# Patient Record
Sex: MALE | Race: WHITE | NOT HISPANIC OR LATINO | Employment: UNEMPLOYED | URBAN - METROPOLITAN AREA
[De-identification: names, ages, dates, MRNs, and addresses within clinical notes are randomized per-mention and may not be internally consistent; named-entity substitution may affect disease eponyms.]

---

## 2020-01-12 ENCOUNTER — HOSPITAL ENCOUNTER (EMERGENCY)
Facility: HOSPITAL | Age: 35
Discharge: HOME | End: 2020-01-12
Attending: INTERNAL MEDICINE
Payer: COMMERCIAL

## 2020-01-12 VITALS
WEIGHT: 175 LBS | RESPIRATION RATE: 20 BRPM | OXYGEN SATURATION: 99 % | SYSTOLIC BLOOD PRESSURE: 142 MMHG | DIASTOLIC BLOOD PRESSURE: 81 MMHG | HEART RATE: 78 BPM | TEMPERATURE: 97.4 F

## 2020-01-12 DIAGNOSIS — M25.511 ACUTE PAIN OF RIGHT SHOULDER: Primary | ICD-10-CM

## 2020-01-12 PROCEDURE — 99283 EMERGENCY DEPT VISIT LOW MDM: CPT

## 2020-01-12 PROCEDURE — 99284 EMERGENCY DEPT VISIT MOD MDM: CPT | Performed by: INTERNAL MEDICINE

## 2020-01-12 PROCEDURE — 96372 THER/PROPH/DIAG INJ SC/IM: CPT

## 2020-01-12 RX ORDER — METHYLPREDNISOLONE 4 MG/1
TABLET ORAL
Qty: 21 TABLET | Refills: 0 | Status: SHIPPED | OUTPATIENT
Start: 2020-01-12

## 2020-01-12 RX ORDER — PREDNISONE 20 MG/1
40 TABLET ORAL ONCE
Status: COMPLETED | OUTPATIENT
Start: 2020-01-12 | End: 2020-01-12

## 2020-01-12 RX ORDER — KETOROLAC TROMETHAMINE 30 MG/ML
30 INJECTION, SOLUTION INTRAMUSCULAR; INTRAVENOUS ONCE
Status: COMPLETED | OUTPATIENT
Start: 2020-01-12 | End: 2020-01-12

## 2020-01-12 RX ORDER — GABAPENTIN 300 MG/1
300 CAPSULE ORAL 3 TIMES DAILY
Qty: 30 CAPSULE | Refills: 0 | Status: SHIPPED | OUTPATIENT
Start: 2020-01-12

## 2020-01-12 RX ADMIN — KETOROLAC TROMETHAMINE 30 MG: 30 INJECTION, SOLUTION INTRAMUSCULAR; INTRAVENOUS at 07:59

## 2020-01-12 RX ADMIN — PREDNISONE 40 MG: 20 TABLET ORAL at 08:39

## 2020-01-12 NOTE — ED PROVIDER NOTES
History  Chief Complaint   Patient presents with    Shoulder Pain     onset fri  Denies injury/trauma  Took 5 Aleeve this am w/o relief     52-year-old male arrives via EMS complaining of right shoulder pain  The patient is actually pointing to his [facility] joint states the pain is radiating down his shoulder to his hand  He has tingling and burning  Patient states the pain started on Friday he denies any trauma or injury or previous event  States he cannot get comfortable the pain is with any kind of movement even at rest but worse with certain movements  He denies any chest pain pressure heaviness tightness or shortness of breath  He states he took 5 extra-strength Tylenol over about a 4 hour period without any relief  None       History reviewed  No pertinent past medical history  History reviewed  No pertinent surgical history  History reviewed  No pertinent family history  I have reviewed and agree with the history as documented  Social History     Tobacco Use    Smoking status: Current Every Day Smoker    Smokeless tobacco: Never Used   Substance Use Topics    Alcohol use: Never     Frequency: Never    Drug use: Yes     Types: Marijuana        Review of Systems   Constitutional: Negative  Respiratory: Negative  Cardiovascular: Negative  Gastrointestinal: Negative  Genitourinary: Negative  Musculoskeletal: Positive for arthralgias  Negative for neck pain and neck stiffness  Skin: Negative  Neurological: Negative  Hematological: Negative  Psychiatric/Behavioral: Negative  Physical Exam  Physical Exam   Constitutional: He is oriented to person, place, and time  He appears well-developed and well-nourished  HENT:   Head: Normocephalic and atraumatic  Eyes: Pupils are equal, round, and reactive to light  EOM are normal    Neck: Normal range of motion  Neck supple     Patient with full range of motion no point tenderness no pain on rotation of head and neck    Cardiovascular: Normal rate, regular rhythm, normal heart sounds and intact distal pulses  Pulmonary/Chest: Effort normal and breath sounds normal    Abdominal: Soft  Bowel sounds are normal    Musculoskeletal: Normal range of motion  He exhibits tenderness  Patient has full range of motion of his shoulder but he has point tenderness in the [facility] joint and some over the deltoid itself  There is no erythema no rash no other type of lesion  Pain seems to actually be alleviated when lifting to a certain level at about 30° however when she had about 45° the pain starts becoming more intense  Neurological: He is alert and oriented to person, place, and time  Skin: Skin is warm and dry  Capillary refill takes less than 2 seconds  Psychiatric: He has a normal mood and affect  His behavior is normal    Nursing note and vitals reviewed        Vital Signs  ED Triage Vitals   Temperature Pulse Respirations Blood Pressure SpO2   01/12/20 0744 01/12/20 0744 01/12/20 0744 01/12/20 0744 01/12/20 0744   (!) 97 4 °F (36 3 °C) 80 18 159/94 100 %      Temp Source Heart Rate Source Patient Position - Orthostatic VS BP Location FiO2 (%)   01/12/20 0744 01/12/20 0744 01/12/20 0744 01/12/20 0744 --   Temporal Monitor Lying Left arm       Pain Score       01/12/20 0745       Worst Possible Pain           Vitals:    01/12/20 0744   BP: 159/94   Pulse: 80   Patient Position - Orthostatic VS: Lying         Visual Acuity      ED Medications  Medications   predniSONE tablet 40 mg (has no administration in time range)   ketorolac (TORADOL) injection 30 mg (30 mg Intramuscular Given 1/12/20 0751)       Diagnostic Studies  Results Reviewed     None                 No orders to display              Procedures  Procedures         ED Course                               MDM      Disposition  Final diagnoses:   Acute pain of right shoulder     Time reflects when diagnosis was documented in both MDM as applicable and the Disposition within this note     Time User Action Codes Description Comment    1/12/2020  8:33 AM Austin Later Add [B11 047] Acute pain of right shoulder       ED Disposition     ED Disposition Condition Date/Time Comment    Discharge Stable Sun Jan 12, 2020  8:33 AM Lorelei Gearing Best discharge to home/self care  Follow-up Information    None         Patient's Medications   Discharge Prescriptions    GABAPENTIN (NEURONTIN) 300 MG CAPSULE    Take 1 capsule (300 mg total) by mouth 3 (three) times a day For post-herpetic neuralgia: Take 1 tablet on day 1,  Then take 2 tablets on day 2, Then take 3 tablets on day 3 and every day after that as instructed by your doctor  Start Date: 1/12/2020 End Date: --       Order Dose: 300 mg       Quantity: 30 capsule    Refills: 0    METHYLPREDNISOLONE 4 MG TABLET THERAPY PACK    Use as directed on package       Start Date: 1/12/2020 End Date: --       Order Dose: --       Quantity: 21 tablet    Refills: 0     No discharge procedures on file      ED Provider  Electronically Signed by           Vickie Bass MD  01/12/20 6519

## 2022-12-02 ENCOUNTER — HOSPITAL ENCOUNTER (EMERGENCY)
Facility: HOSPITAL | Age: 37
Discharge: HOME | End: 2022-12-02
Attending: EMERGENCY MEDICINE

## 2022-12-02 VITALS
SYSTOLIC BLOOD PRESSURE: 109 MMHG | OXYGEN SATURATION: 96 % | HEART RATE: 72 BPM | TEMPERATURE: 97.1 F | RESPIRATION RATE: 18 BRPM | DIASTOLIC BLOOD PRESSURE: 64 MMHG | HEIGHT: 72 IN | BODY MASS INDEX: 22.35 KG/M2 | WEIGHT: 165 LBS

## 2022-12-02 DIAGNOSIS — T50.901A OVERDOSE: Primary | ICD-10-CM

## 2022-12-02 RX ORDER — NALOXONE HYDROCHLORIDE 1 MG/ML
1 INJECTION PARENTERAL ONCE
Status: COMPLETED | OUTPATIENT
Start: 2022-12-02 | End: 2022-12-02

## 2022-12-02 RX ADMIN — NALOXONE HYDROCHLORIDE 4 MG: 4 SPRAY NASAL at 15:42

## 2022-12-02 NOTE — ED PROVIDER NOTES
History  Chief Complaint   Patient presents with   • Overdose - Accidental     71-year-old male presents after he was found unresponsive  Patient states that he found some substance and snorted it  The patient was found by EMS with pinpoint pupils, lethargic  He was given 2 mg of Narcan intranasally his mental status improved  Patient currently denies complaint except being cold  Of note his significant other was also found in a similar condition and is also a patient in the emergency department, which he is aware of  When she overdosed he tried to arouse her by placing her in the cold shower  Patient then overdose in the cold shower  He is currently wet, and shivering  Others and being cold he has no complaints  Prior to Admission Medications   Prescriptions Last Dose Informant Patient Reported? Taking?   gabapentin (NEURONTIN) 300 mg capsule   No No   Sig: Take 1 capsule (300 mg total) by mouth 3 (three) times a day For post-herpetic neuralgia: Take 1 tablet on day 1,  Then take 2 tablets on day 2, Then take 3 tablets on day 3 and every day after that as instructed by your doctor  methylPREDNISolone 4 MG tablet therapy pack   No No   Sig: Use as directed on package      Facility-Administered Medications: None       History reviewed  No pertinent past medical history  History reviewed  No pertinent surgical history  History reviewed  No pertinent family history  I have reviewed and agree with the history as documented  E-Cigarette/Vaping     E-Cigarette/Vaping Substances     Social History     Tobacco Use   • Smoking status: Every Day   • Smokeless tobacco: Never   Substance Use Topics   • Alcohol use: Never   • Drug use: Yes     Types: Marijuana       Review of Systems    Physical Exam  Physical Exam  Vitals and nursing note reviewed  Constitutional:       Appearance: He is well-developed and well-nourished  HENT:      Head: Normocephalic and atraumatic        Mouth/Throat: Mouth: Oropharynx is clear and moist    Eyes:      Extraocular Movements: EOM normal       Conjunctiva/sclera: Conjunctivae normal       Pupils: Pupils are equal, round, and reactive to light  Neck:      Trachea: No tracheal deviation  Cardiovascular:      Rate and Rhythm: Normal rate and regular rhythm  Heart sounds: Normal heart sounds  No murmur heard  Pulmonary:      Effort: Pulmonary effort is normal  No respiratory distress  Breath sounds: Normal breath sounds  No wheezing or rales  Abdominal:      General: Bowel sounds are normal  There is no distension  Palpations: Abdomen is soft  Tenderness: There is no abdominal tenderness  Musculoskeletal:         General: No deformity or edema  Cervical back: Normal range of motion and neck supple  Skin:     General: Skin is warm and dry  Capillary Refill: Capillary refill takes less than 2 seconds  Neurological:      Mental Status: He is alert and oriented to person, place, and time  Sensory: No sensory deficit  Psychiatric:         Mood and Affect: Mood and affect and mood normal          Judgment: Judgment normal          Vital Signs  ED Triage Vitals   Temperature Pulse Respirations Blood Pressure SpO2   12/02/22 1548 12/02/22 1527 12/02/22 1527 12/02/22 1527 12/02/22 1527   (!) 96 6 °F (35 9 °C) 77 16 155/96 98 %      Temp Source Heart Rate Source Patient Position - Orthostatic VS BP Location FiO2 (%)   12/02/22 1548 12/02/22 1527 12/02/22 1527 12/02/22 1527 --   Temporal Monitor Lying Left arm       Pain Score       12/02/22 1527       No Pain           Vitals:    12/02/22 1527 12/02/22 1750 12/02/22 1837   BP: 155/96 97/59 109/64   Pulse: 77 98 72   Patient Position - Orthostatic VS: Lying Lying Lying         Visual Acuity      ED Medications  Medications   naloxone nasal- Given to patient by provider at discharge   (NARCAN) 4 mg/0 1 mL nasal spray 4 mg (4 mg Does not apply Given by Other 12/2/22 1542)   naloxone (FOR EMS ONLY) [facility]) 2 MG/2ML injection 2 mg (0 mg Does not apply Given to EMS 12/2/22 1539)       Diagnostic Studies  Results Reviewed     None                 No orders to display              Procedures  Procedures         ED Course  ED Course as of 12/02/22 2209   [facility] Dec 02, 2022   1602 Patient requested discharge, convinced to stay longer for observation  Mental status continues to be intact   1623 Again requesting discharge, convinced to stay                                             MDM  Number of Diagnoses or Management Options  Overdose: new and requires workup  Diagnosis management comments: 60-year-old male with symptoms consistent with an overdose, currently has a normal mental status  He requested discharge immediately upon being roomed  Understands the risks of repeat overdose, will dispense Narcan for patient to go home with  Amount and/or Complexity of Data Reviewed  Review and summarize past medical records: yes  Independent visualization of images, tracings, or specimens: yes    Risk of Complications, Morbidity, and/or Mortality  Presenting problems: high  Diagnostic procedures: minimal  Management options: high        Disposition  Final diagnoses:   Overdose     Time reflects when diagnosis was documented in both MDM as applicable and the Disposition within this note     Time User Action Codes Description Comment    12/2/2022  6:13 PM Manuel [address] [place] [address] [place] Overdose       ED Disposition     ED Disposition   Discharge    Condition   Stable    Date/Time   Fri Dec 2, 2022  6:13 PM    Comment   Myra Parsons discharge to home/self care                 Follow-up Information     Follow up With Specialties Details Why Contact Info Additional [address] [facility] Schedule an appointment as soon as possible for a visit in 1 day As needed [address] [facility] [address] [facility], [address] [place], [place], [address], [phone]          Discharge Medication List as of 12/2/2022  6:13 PM      CONTINUE these medications which have NOT CHANGED    Details   gabapentin (NEURONTIN) 300 mg capsule Take 1 capsule (300 mg total) by mouth 3 (three) times a day For post-herpetic neuralgia: Take 1 tablet on day 1,  Then take 2 tablets on day 2, Then take 3 tablets on day 3 and every day after that as instructed by your doctor , Starting Sun 1/12/2020,  Normal      methylPREDNISolone 4 MG tablet therapy pack Use as directed on package, Normal             No discharge procedures on file      PDMP Review     None          ED Provider  Electronically Signed by           Katie Rose DO  12/02/22 2857

## 2023-08-03 ENCOUNTER — HOSPITAL ENCOUNTER (EMERGENCY)
Facility: HOSPITAL | Age: 38
End: 2023-08-04
Attending: EMERGENCY MEDICINE | Admitting: EMERGENCY MEDICINE
Payer: COMMERCIAL

## 2023-08-03 ENCOUNTER — DOCUMENTATION (OUTPATIENT)
Dept: BEHAVIORAL/MENTAL HEALTH CLINIC | Facility: CLINIC | Age: 38
End: 2023-08-03

## 2023-08-03 ENCOUNTER — OFFICE VISIT (OUTPATIENT)
Dept: BEHAVIORAL/MENTAL HEALTH CLINIC | Facility: CLINIC | Age: 38
End: 2023-08-03
Payer: COMMERCIAL

## 2023-08-03 DIAGNOSIS — F19.90 SUBSTANCE USE: ICD-10-CM

## 2023-08-03 DIAGNOSIS — F33.2 SEVERE EPISODE OF RECURRENT MAJOR DEPRESSIVE DISORDER, WITHOUT PSYCHOTIC FEATURES (HCC): Primary | ICD-10-CM

## 2023-08-03 DIAGNOSIS — F32.A DEPRESSION WITH SUICIDAL IDEATION: Primary | ICD-10-CM

## 2023-08-03 DIAGNOSIS — F32.0 CURRENT MILD EPISODE OF MAJOR DEPRESSIVE DISORDER WITHOUT PRIOR EPISODE (HCC): ICD-10-CM

## 2023-08-03 DIAGNOSIS — R45.851 DEPRESSION WITH SUICIDAL IDEATION: Primary | ICD-10-CM

## 2023-08-03 PROCEDURE — 82075 ASSAY OF BREATH ETHANOL: CPT | Performed by: EMERGENCY MEDICINE

## 2023-08-03 PROCEDURE — H2021 COM WRAP-AROUND SV, 15 MIN: HCPCS

## 2023-08-03 NOTE — LETTER
[address] [address]  Dept: [phone]      EMTALA TRANSFER CONSENT    NAME Cristina Parsons                                         1985                              MRN [id number]    I have been informed of my rights regarding examination, treatment, and transfer   by Dr. Gregory Lamas., *    Benefits: Continuity of care    Risks: Potential for delay in receiving treatment      Consent for Transfer:  I acknowledge that my medical condition has been evaluated and explained to me by the emergency department physician or other qualified medical person and/or my attending physician, who has recommended that I be transferred to the service of  Accepting Physician: Rich Benitez at [address] [facility] [address] Name, [address] : [facility]FF[facility] WITH [place], [place]. The above potential benefits of such transfer, the potential risks associated with such transfer, and the probable risks of not being transferred have been explained to me, and I fully understand them. The doctor has explained that, in my case, the benefits of transfer outweigh the risks. I agree to be transferred. I authorize the performance of emergency medical procedures and treatments upon me in both transit and upon arrival at the receiving facility. Additionally, I authorize the release of any and all medical records to the receiving facility and request they be transported with me, if possible. I understand that the safest mode of transportation during a medical emergency is an ambulance and that the Hospital advocates the use of this mode of transport. Risks of traveling to the receiving facility by car, including absence of medical control, life sustaining equipment, such as oxygen, and medical personnel has been explained to me and I fully understand them. (EVGENY CORRECT BOX BELOW)  [ X ]  I consent to the stated transfer and to be transported by ambulance/helicopter.   [  ] I consent to the stated transfer, but refuse transportation by ambulance and accept full responsibility for my transportation by car. I understand the risks of non-ambulance transfers and I exonerate the Hospital and its staff from any deterioration in my condition that results from this refusal.    X___________________________________________    DATE  23  TIME________  Signature of patient or legally responsible individual signing on patient behalf           RELATIONSHIP TO PATIENT______self___________________                  Provider Certification    NAME Yoselin Parsons                                         1985                              MRN [id number]    A medical screening exam was performed on the above named patient. Based on the examination:    Condition Necessitating Transfer The primary encounter diagnosis was Depression with suicidal ideation. A diagnosis of Substance use was also pertinent to this visit. Patient Condition: The patient has been stabilized such that within reasonable medical probability, no material deterioration of the patient condition or the condition of the unborn child(rhea) is likely to result from the transfer    Reason for Transfer: Level of Care needed not available at this facility    Transfer Requirements: Facility [facility], [address] available and qualified personnel available for treatment as acknowledged by Minerva Zaldivar [phone]  Agreed to accept transfer and to provide appropriate medical treatment as acknowledged by       Deborah Acosta  Appropriate medical records of the examination and treatment of the patient are provided at the time of transfer   [address] __IK_____  Transfer will be performed by qualified personnel from [facility]  and appropriate transfer equipment as required, including the use of necessary and appropriate life support measures.     Provider Certification: I have examined the patient and explained the following risks and benefits of being transferred/refusing transfer to the patient/family:  The patient is stable for psychiatric transfer because they are medically stable, and is protected from harming him/herself or others during transport      Based on these reasonable risks and benefits to the patient and/or the unborn child(rhea), and based upon the information available at the time of the patient’s examination, I certify that the medical benefits reasonably to be expected from the provision of appropriate medical treatments at another medical facility outweigh the increasing risks, if any, to the individual’s medical condition, and in the case of labor to the unborn child, from effecting the transfer.     X____________________________________________ DATE 08/04/23        TIME_______      ORIGINAL - SEND TO MEDICAL RECORDS   COPY - SEND WITH PATIENT DURING TRANSFER

## 2023-08-04 ENCOUNTER — HOSPITAL ENCOUNTER (INPATIENT)
Facility: HOSPITAL | Age: 38
LOS: 2 days | Discharge: HOME | DRG: 754 | End: 2023-08-06
Attending: HOSPITALIST | Admitting: HOSPITALIST
Payer: COMMERCIAL

## 2023-08-04 VITALS
OXYGEN SATURATION: 99 % | HEIGHT: 72 IN | SYSTOLIC BLOOD PRESSURE: 122 MMHG | BODY MASS INDEX: 22.38 KG/M2 | HEART RATE: 85 BPM | RESPIRATION RATE: 20 BRPM | TEMPERATURE: 97.3 F | DIASTOLIC BLOOD PRESSURE: 79 MMHG

## 2023-08-04 DIAGNOSIS — F39 MOOD DISORDER (HCC): ICD-10-CM

## 2023-08-04 DIAGNOSIS — F41.9 ANXIETY: ICD-10-CM

## 2023-08-04 DIAGNOSIS — F32.2 CURRENT SEVERE EPISODE OF MAJOR DEPRESSIVE DISORDER WITHOUT PSYCHOTIC FEATURES WITHOUT PRIOR EPISODE (HCC): Primary | ICD-10-CM

## 2023-08-04 DIAGNOSIS — F19.90 SUBSTANCE USE: ICD-10-CM

## 2023-08-04 LAB
AMPHETAMINES SERPL QL SCN: POSITIVE
BARBITURATES UR QL: NEGATIVE
BENZODIAZ UR QL: NEGATIVE
COCAINE UR QL: NEGATIVE
ETHANOL EXG-MCNC: 0 MG/DL
METHADONE UR QL: NEGATIVE
OPIATES UR QL SCN: NEGATIVE
OXYCODONE+OXYMORPHONE UR QL SCN: NEGATIVE
PCP UR QL: NEGATIVE
THC UR QL: POSITIVE

## 2023-08-04 PROCEDURE — 80307 DRUG TEST PRSMV CHEM ANLYZR: CPT | Performed by: EMERGENCY MEDICINE

## 2023-08-04 RX ORDER — ACETAMINOPHEN 325 MG/1
650 TABLET ORAL EVERY 4 HOURS PRN
Status: DISCONTINUED | OUTPATIENT
Start: 2023-08-04 | End: 2023-08-06 | Stop reason: HOSPADM

## 2023-08-04 RX ORDER — OLANZAPINE 2.5 MG/1
2.5 TABLET ORAL
Status: CANCELLED | OUTPATIENT
Start: 2023-08-04

## 2023-08-04 RX ORDER — MAGNESIUM HYDROXIDE/ALUMINUM HYDROXICE/SIMETHICONE 120; 1200; 1200 MG/30ML; MG/30ML; MG/30ML
30 SUSPENSION ORAL EVERY 4 HOURS PRN
Status: DISCONTINUED | OUTPATIENT
Start: 2023-08-04 | End: 2023-08-06 | Stop reason: HOSPADM

## 2023-08-04 RX ORDER — BENZTROPINE MESYLATE 0.5 MG/1
1 TABLET ORAL EVERY 6 HOURS PRN
Status: CANCELLED | OUTPATIENT
Start: 2023-08-04

## 2023-08-04 RX ORDER — HYDROXYZINE 50 MG/1
50 TABLET, FILM COATED ORAL
Status: DISCONTINUED | OUTPATIENT
Start: 2023-08-04 | End: 2023-08-06 | Stop reason: HOSPADM

## 2023-08-04 RX ORDER — TRAZODONE HYDROCHLORIDE 50 MG/1
100 TABLET ORAL
Status: CANCELLED | OUTPATIENT
Start: 2023-08-04

## 2023-08-04 RX ORDER — OLANZAPINE 2.5 MG/1
2.5 TABLET ORAL
Status: DISCONTINUED | OUTPATIENT
Start: 2023-08-04 | End: 2023-08-06 | Stop reason: HOSPADM

## 2023-08-04 RX ORDER — OLANZAPINE 10 MG/1
2.5 INJECTION, POWDER, LYOPHILIZED, FOR SOLUTION INTRAMUSCULAR
Status: DISCONTINUED | OUTPATIENT
Start: 2023-08-04 | End: 2023-08-06 | Stop reason: HOSPADM

## 2023-08-04 RX ORDER — OLANZAPINE 5 MG/1
5 TABLET ORAL
Status: CANCELLED | OUTPATIENT
Start: 2023-08-04

## 2023-08-04 RX ORDER — HYDROXYZINE 50 MG/1
100 TABLET, FILM COATED ORAL
Status: CANCELLED | OUTPATIENT
Start: 2023-08-04

## 2023-08-04 RX ORDER — DIPHENHYDRAMINE HYDROCHLORIDE 50 MG/ML
50 INJECTION INTRAMUSCULAR; INTRAVENOUS EVERY 6 HOURS PRN
Status: DISCONTINUED | OUTPATIENT
Start: 2023-08-04 | End: 2023-08-06 | Stop reason: HOSPADM

## 2023-08-04 RX ORDER — HYDROXYZINE 50 MG/1
100 TABLET, FILM COATED ORAL
Status: DISCONTINUED | OUTPATIENT
Start: 2023-08-04 | End: 2023-08-06 | Stop reason: HOSPADM

## 2023-08-04 RX ORDER — TRAZODONE HYDROCHLORIDE 100 MG/1
100 TABLET ORAL
Status: DISCONTINUED | OUTPATIENT
Start: 2023-08-04 | End: 2023-08-06 | Stop reason: HOSPADM

## 2023-08-04 RX ORDER — OLANZAPINE 10 MG/1
2.5 INJECTION, POWDER, LYOPHILIZED, FOR SOLUTION INTRAMUSCULAR
Status: CANCELLED | OUTPATIENT
Start: 2023-08-04

## 2023-08-04 RX ORDER — HYDROXYZINE HYDROCHLORIDE 25 MG/1
25 TABLET, FILM COATED ORAL
Status: DISCONTINUED | OUTPATIENT
Start: 2023-08-04 | End: 2023-08-06 | Stop reason: HOSPADM

## 2023-08-04 RX ORDER — OLANZAPINE 10 MG/1
5 INJECTION, POWDER, LYOPHILIZED, FOR SOLUTION INTRAMUSCULAR
Status: DISCONTINUED | OUTPATIENT
Start: 2023-08-04 | End: 2023-08-06 | Stop reason: HOSPADM

## 2023-08-04 RX ORDER — ACETAMINOPHEN 325 MG/1
650 TABLET ORAL EVERY 4 HOURS PRN
Status: CANCELLED | OUTPATIENT
Start: 2023-08-04

## 2023-08-04 RX ORDER — LORAZEPAM 2 MG/ML
2 INJECTION INTRAMUSCULAR EVERY 6 HOURS PRN
Status: CANCELLED | OUTPATIENT
Start: 2023-08-04

## 2023-08-04 RX ORDER — ACETAMINOPHEN 325 MG/1
975 TABLET ORAL EVERY 6 HOURS PRN
Status: CANCELLED | OUTPATIENT
Start: 2023-08-04

## 2023-08-04 RX ORDER — HYDROXYZINE 50 MG/1
50 TABLET, FILM COATED ORAL
Status: CANCELLED | OUTPATIENT
Start: 2023-08-04

## 2023-08-04 RX ORDER — HYDROXYZINE HYDROCHLORIDE 25 MG/1
25 TABLET, FILM COATED ORAL
Status: CANCELLED | OUTPATIENT
Start: 2023-08-04

## 2023-08-04 RX ORDER — ACETAMINOPHEN 325 MG/1
975 TABLET ORAL EVERY 6 HOURS PRN
Status: DISCONTINUED | OUTPATIENT
Start: 2023-08-04 | End: 2023-08-06 | Stop reason: HOSPADM

## 2023-08-04 RX ORDER — NICOTINE 21 MG/24HR
1 PATCH, TRANSDERMAL 24 HOURS TRANSDERMAL DAILY
Status: CANCELLED | OUTPATIENT
Start: 2023-08-04

## 2023-08-04 RX ORDER — POLYETHYLENE GLYCOL 3350 17 G/17G
17 POWDER, FOR SOLUTION ORAL DAILY PRN
Status: CANCELLED | OUTPATIENT
Start: 2023-08-04

## 2023-08-04 RX ORDER — ACETAMINOPHEN 325 MG/1
650 TABLET ORAL EVERY 6 HOURS PRN
Status: CANCELLED | OUTPATIENT
Start: 2023-08-04

## 2023-08-04 RX ORDER — DIPHENHYDRAMINE HYDROCHLORIDE 50 MG/ML
50 INJECTION INTRAMUSCULAR; INTRAVENOUS EVERY 6 HOURS PRN
Status: CANCELLED | OUTPATIENT
Start: 2023-08-04

## 2023-08-04 RX ORDER — NICOTINE 21 MG/24HR
1 PATCH, TRANSDERMAL 24 HOURS TRANSDERMAL DAILY
Status: DISCONTINUED | OUTPATIENT
Start: 2023-08-05 | End: 2023-08-06 | Stop reason: HOSPADM

## 2023-08-04 RX ORDER — OLANZAPINE 5 MG/1
5 TABLET ORAL
Status: DISCONTINUED | OUTPATIENT
Start: 2023-08-04 | End: 2023-08-06 | Stop reason: HOSPADM

## 2023-08-04 RX ORDER — LORAZEPAM 2 MG/ML
2 INJECTION INTRAMUSCULAR EVERY 6 HOURS PRN
Status: DISCONTINUED | OUTPATIENT
Start: 2023-08-04 | End: 2023-08-06 | Stop reason: HOSPADM

## 2023-08-04 RX ORDER — MAGNESIUM HYDROXIDE/ALUMINUM HYDROXICE/SIMETHICONE 120; 1200; 1200 MG/30ML; MG/30ML; MG/30ML
30 SUSPENSION ORAL EVERY 4 HOURS PRN
Status: CANCELLED | OUTPATIENT
Start: 2023-08-04

## 2023-08-04 RX ORDER — OLANZAPINE 10 MG/1
5 INJECTION, POWDER, LYOPHILIZED, FOR SOLUTION INTRAMUSCULAR
Status: CANCELLED | OUTPATIENT
Start: 2023-08-04

## 2023-08-04 RX ORDER — ACETAMINOPHEN 325 MG/1
650 TABLET ORAL EVERY 6 HOURS PRN
Status: DISCONTINUED | OUTPATIENT
Start: 2023-08-04 | End: 2023-08-06 | Stop reason: HOSPADM

## 2023-08-04 RX ORDER — POLYETHYLENE GLYCOL 3350 17 G/17G
17 POWDER, FOR SOLUTION ORAL DAILY PRN
Status: DISCONTINUED | OUTPATIENT
Start: 2023-08-04 | End: 2023-08-06 | Stop reason: HOSPADM

## 2023-08-04 RX ORDER — BENZTROPINE MESYLATE 1 MG/1
1 TABLET ORAL EVERY 6 HOURS PRN
Status: DISCONTINUED | OUTPATIENT
Start: 2023-08-04 | End: 2023-08-06 | Stop reason: HOSPADM

## 2023-08-04 NOTE — PROGRESS NOTES
Pt admitted with the following belongings     Pt belongings to room;    2 T-shirts   1 Pair of white socks   1 Shoes     Pt belongings to storage;    1 Jeans   1 Tan Joggers   1 Green shirt  1 Red sweatshirt with barber  1 Gray and Black backpack   1 Protein bar   1 Black money card (chipped in the middle)  Laces from shoes   Screw  tool   Wrench tool   Bag of Cigarettes     Patient belongings to drawer;    3 Lighters   1 White    2 headphones (white and blue)  1 Black cube for charging   1 Cellular device

## 2023-08-04 NOTE — PROGRESS NOTES
Assessment      Crisis Intake  Patient Intake  Living Arrangement: Apartment  Can patient return home?: Yes  Address to be Discharge to[de-identified] REfer to face sheet  Patient's Telephone Number: Refer to face sheet  Access to Firearms: No (Denies)  Work History: Unemployed  Admission Status  Status of Admission: [address] of Residence: [place]  Act 77: [address] [facility] Notified?: No  72 Hour Notice: Initiated  Date patient signed 72h Notice: 08/03/23  Time patient signed 72h Notice: 0853  Patient History  Presenting Problems: Pt presented to the [address] [place] [address] for a psychiatric evaluation. He endorses suicidal ideation with no specific plan. Pt states he has been feeling derpessed for several weeks. He lost his job 2 days ago. He has been sleep deprieved, loss of appetite. Pt states he has not showered in 2 weeks. He denies alcohol or substance use, denies homicidal ideations, auditory, visual halluciations or thougths to self harm. Pt denies past psychiatric history. Pt is oriented across all spheres, has a flat affect, speaks softly and has a depressed mood. This writer discussed treatment options with patient. Patient signed 61 [phone] 81 after rights and 72 hours were discussed and reviewed. Treatment History: Denies past treatment history  Currently in Treatment: No  Medical Problems: refer to medical report  Legal Issues: Denies  Probation/ Name (if applicable): Denies  Substance Abuse: No (Denies)  Mental Status Exam  Orientation Level: Appropriate for age, Oriented X4  Mood: Depressed, Despairing  Thought Content: Suicidal ideation  Hallucination Type: No problems reported or observed.   Judgement: Poor  Impulse Control: Fair  Attention Span: Appropriate for age  Memory: Intact  Appetite: Poor  Sleep: Poor  Strengths and Limitations  Patient Strengths: Cooperative, Negotiates basic needs  Patient Limitations: Financial instability, Unresourceful, Poor reasoning ability  Ideations  Current Self Harm/Suicidal Ideation: Yes  Description of current self harm/suicidal ideation[de-identified] Pt is suicidal with no specific plan  Previous Self Harm/Suicidal Ideation: No (Denies)  Current homicidal or violent thoughts toward another: Denies ideations within past 6 months  Previous Plans to Harm Another Person: No (Denies)  Previous History of Violence to Others: No  Intake Assessment Outcome  Patient Plan: Inpatient  Tx Plan Signed: Yes  Current Status[de-identified] 258    C-SSRS         Patient was referred by: Self or Family    Visit start and stop times:    08/03/23

## 2023-08-04 NOTE — ED PROVIDER NOTES
History  Chief Complaint   Patient presents with   • Psychiatric Evaluation     Patient [facility] ems [facility] for suicidal ideations w/ intent  without a plan      52-year-old male presenting to the ED for depression with suicidal ideation without a plan. Patient denies homicidal ideation. Denies auditory visual hallucinations. Denies physical pain, alcohol or drugs beyond marijuana usage. Prior to Admission Medications   Prescriptions Last Dose Informant Patient Reported? Taking?   gabapentin (NEURONTIN) 300 mg capsule   No No   Sig: Take 1 capsule (300 mg total) by mouth 3 (three) times a day For post-herpetic neuralgia: Take 1 tablet on day 1,  Then take 2 tablets on day 2, Then take 3 tablets on day 3 and every day after that as instructed by your doctor. methylPREDNISolone 4 MG tablet therapy pack   No No   Sig: Use as directed on package      Facility-Administered Medications: None       History reviewed. No pertinent past medical history. History reviewed. No pertinent surgical history. History reviewed. No pertinent family history. I have reviewed and agree with the history as documented. E-Cigarette/Vaping     E-Cigarette/Vaping Substances     Social History     Tobacco Use   • Smoking status: Every Day   • Smokeless tobacco: Never   Substance Use Topics   • Alcohol use: Never   • Drug use: Yes     Types: Marijuana       Review of Systems   Psychiatric/Behavioral: Positive for dysphoric mood and suicidal ideas. All other systems reviewed and are negative. Physical Exam  Physical Exam  Psychiatric:         Mood and Affect: Mood is depressed. Affect is flat and tearful. Speech: Speech is delayed. Behavior: Behavior normal. Behavior is cooperative. Thought Content: Thought content is not paranoid or delusional. Thought content includes suicidal ideation. Thought content does not include homicidal ideation.  Thought content does not include homicidal or suicidal plan. Cognition and Memory: Cognition and memory normal.       General: VS reviewed  Appears in NAD  awake, alert. Well-nourished, well-developed. Appears stated age. Speaking normally in full sentences. Head: Normocephalic, atraumatic  Eyes: EOM-I. No diplopia. No hyphema. No subconjunctival hemorrhages. Symmetrical lids. ENT: Atraumatic external nose and ears. MMM  No malocclusion. No stridor. Normal phonation. No drooling. Normal swallowing. Neck: No JVD. CV: No pallor noted  Lungs:   No tachypnea  No respiratory distress  MSK:   FROM spontaneously  Skin: Dry, intact. Neuro: Awake, alert, GCS15, CN II-XII grossly intact. Motor grossly intact.  Exam: deferred        Vital Signs  ED Triage Vitals   Temperature Pulse Respirations Blood Pressure SpO2   08/03/23 2227 08/03/23 2225 08/03/23 2225 08/03/23 2225 08/03/23 2225   (!) 97.3 °F (36.3 °C) 84 18 135/79 95 %      Temp Source Heart Rate Source Patient Position - Orthostatic VS BP Location FiO2 (%)   08/03/23 2227 08/03/23 2225 08/03/23 2225 08/03/23 2225 --   Temporal Monitor Sitting Left arm       Pain Score       --                  Vitals:    08/03/23 2225   BP: 135/79   Pulse: 84   Patient Position - Orthostatic VS: Sitting         Visual Acuity      ED Medications  Medications - No data to display    Diagnostic Studies  Results Reviewed     Procedure Component Value Units Date/Time    Rapid drug screen, urine [[id number]]     Lab Status: No result Specimen: Urine     POCT alcohol breath test [[id number]]     Lab Status: No result                  No orders to display              Procedures  Procedures         ED Course                               SBIRT 22yo+    Flowsheet Row Most Recent Value   Initial Alcohol Screen: US AUDIT-C     1. How often do you have a drink containing alcohol? 0 Filed at: 08/03/2023 2224   2.  How many drinks containing alcohol do you have on a typical day you are drinking? 0 Filed at: 08/03/2023 2224   3a. Male UNDER 65: How often do you have five or more drinks on one occasion? 0 Filed at: 08/03/2023 2224   3b. FEMALE Any Age, or MALE 65+: How often do you have 4 or more drinks on one occassion? 0 Filed at: 08/03/2023 2224   Audit-C Score 0 Filed at: 08/03/2023 2224   MADDIE: How many times in the past year have you. .. Used an illegal drug or used a prescription medication for non-medical reasons? Never Filed at: 08/03/2023 2224                    Medical Decision Making  45-year-old male with history of depression with suicidal ideation without a plan. Signed 201. Crisis evaluation and pending transfer. Amount and/or Complexity of Data Reviewed  Labs: ordered. Risk  Decision regarding hospitalization. Disposition  Final diagnoses:   Depression with suicidal ideation     Time reflects when diagnosis was documented in both MDM as applicable and the Disposition within this note     Time User Action Codes Description Comment    8/3/2023 10:52 PM Ann Marie Portillo Add [W06. A,  R45.851] Depression with suicidal ideation       ED Disposition     ED Disposition   Transfer to [address] [place] [address]   --    Date/Time   Thu Aug 3, 2023 2252    1041 Jon Vasquez should be transferred out to [facility] and has been medically cleared. Follow-up Information    None         Patient's Medications   Discharge Prescriptions    No medications on file       No discharge procedures on file.     PDMP Review     None          ED Provider  Electronically Signed by           Ang Shaw DO  08/03/23 9553

## 2023-08-04 NOTE — ED NOTES
See previous [facility] Suicide Risk Assessment. Re-screening not required unless change in behavior or suicidal ideation. Behavioral Health Assessment deferred as patient is sleeping and would benefit from additional rest.  Vital signs deferred until patient awake, no signs or symptoms of respiratory distress at this time. Once patient is awake and able to participate, will complete assessments.      Akash Barajas, RN  08/04/23 [facility], RN  08/04/23 2685

## 2023-08-04 NOTE — ED NOTES
Waylon Guerra claimed the ride for Selvin Montalvo in [facility] ED 23 bed ED 23, and will arrive on 08/04/2023 at 2:30pm.

## 2023-08-04 NOTE — H&P
IssaTrevin#  SZN:[id number] [place]   UEJ:[id number]    XDQ:[id number]  Adm Date: 8/4/2023 1543  3:43 PM   ATT PHY: Rani Villaseñor, [facility]         Chief Complaint: depression, suicidal ideations      History of Presenting Illness: Abdiel Hurtado is a(n) 41y.o. year old male who is admitted to [address] [place] [address] on voluntary 201 committment basis. Patient originally presented to [address] [facility] [place] ED on 8/3/2023 for depression with suicidal ideations. Patient examined at bedside. Patient states that he has history of acid reflux but well controlled if he tries to avoid foods which trigger symptoms. Patient denies taking any medications at home. He has no physical complaints at this time. No Known Allergies    No current facility-administered medications on file prior to encounter. Current Outpatient Medications on File Prior to Encounter   Medication Sig Dispense Refill   • gabapentin (NEURONTIN) 300 mg capsule Take 1 capsule (300 mg total) by mouth 3 (three) times a day For post-herpetic neuralgia: Take 1 tablet on day 1,  Then take 2 tablets on day 2, Then take 3 tablets on day 3 and every day after that as instructed by your doctor.  30 capsule 0   • methylPREDNISolone 4 MG tablet therapy pack Use as directed on package 21 tablet 0     Active Ambulatory Problems     Diagnosis Date Noted   • No Active Ambulatory Problems     Resolved Ambulatory Problems     Diagnosis Date Noted   • No Resolved Ambulatory Problems     No Additional Past Medical History     Past Surgical History:   Procedure Laterality Date   • HERNIA REPAIR      7 y/o     Social History:   Social History     Socioeconomic History   • Marital status: Single     Spouse name: Not on file   • Number of children: Not on file   • Years of education: Not on file   • Highest education level: Not on file   Occupational History   • Not on file   Tobacco Use   • Smoking status: Every Day   • Smokeless tobacco: Never   Substance and Sexual Activity   • Alcohol use: Never   • Drug use: Yes     Types: Marijuana   • Sexual activity: Not on file   Other Topics Concern   • Not on file   Social History Narrative   • Not on file     Social Determinants of Health     Financial Resource Strain: Not on file   Food Insecurity: Not on file   Transportation Needs: Not on file   Physical Activity: Not on file   Stress: Not on file   Social Connections: Not on file   Intimate Partner Violence: Not on file   Housing Stability: Not on file     Family History:   Family History   Problem Relation Age of Onset   • Stomach cancer Mother    • No Known Problems Sister    • Heart disease Maternal Grandmother    • Stomach cancer Maternal Uncle      Review of Systems   Constitutional: Positive for appetite change. Negative for chills and fever. HENT: Negative. Negative for ear pain and sore throat. Eyes: Negative. Negative for pain and visual disturbance. Respiratory: Negative. Negative for cough and shortness of breath. Cardiovascular: Negative. Negative for chest pain and palpitations. Gastrointestinal: Negative. Negative for abdominal pain, constipation, diarrhea and vomiting. Genitourinary: Negative. Negative for dysuria and hematuria. Musculoskeletal: Positive for arthralgias. Negative for back pain. Skin: Negative. Negative for color change and rash. Neurological: Negative. Negative for dizziness and headaches. Psychiatric/Behavioral: Positive for dysphoric mood, sleep disturbance and suicidal ideas. All other systems reviewed and are negative. Physical Exam   Vitals: Blood pressure 121/80, pulse 65, temperature 98.1 °F (36.7 °C), resp. rate 18, height 6' 1" (1.854 m), weight 71.2 kg (156 lb 15.5 oz), SpO2 99 %. ,Body mass index is 20.71 kg/m². Constitutional: Awake, alert, in no acute distress. Head: Normocephalic and atraumatic.    Mouth/Throat: Oropharynx is clear and moist.    Eyes: Conjunctivae and EOM are normal.   Neck: Neck supple. No thyromegaly present. Cardiovascular: Normal rate, regular rhythm and normal heart sounds. Pulmonary/Chest: Effort normal and breath sounds normal.   Abdominal: Soft. Bowel sounds are normal.  There is no tenderness. Neurological: No focal deficits. Musculoskeletal:  Nontender spine. Skin: Skin is warm and dry. No edema. Assessment     Tracey Andrea is a(n) 41 y.o. male with MDD. 1. Tobacco abuse. NRT. 2. Insomnia. Patient may take trazodone as needed. 3. GERD. Stable. Maalox as needed. 4. Arthralgias. Patient may take Tylenol as needed. 5. Psych with MDD. This is being managed by the psych team.    Prognosis: Fair. Discharge Plan: In progress. Advanced Directives: I have discussed in detail with the patient the advanced directives. The patient does not have an appointed POA and does not have a living will. Patient's emergency contact is his friend, Romel Samuels, whose number is [phone]. When discussing cardiac and pulmonary resuscitation efforts with the patient, the patient wishes to be full code. I have spent more than 50 minutes gathering data, doing physical examination, and discussing the advanced directives, which was witnessed by caring staff. The patient was discussed with Dr. Leighton Austin and he is in agreement with the above note.

## 2023-08-04 NOTE — ED NOTES
Patient expressed his frustrations about not being able to see his children for the past 6 months. Pt tearful. Pt frustrated with the wait time. Pt denied medication for his anxiety. Pt updated.

## 2023-08-04 NOTE — ED NOTES
See previous [facility] Suicide Risk Assessment. Re-screening not required unless change in behavior or suicidal ideation. Behavioral Health Assessment deferred as patient is sleeping and would benefit from additional rest.  Vital signs deferred until patient awake, no signs or symptoms of respiratory distress at this time. Once patient is awake and able to participate, will complete assessments.      Ross Plummer RN  08/04/23 6513

## 2023-08-04 NOTE — ED NOTES
Insurance Authorization for admission:   Phone call placed to [phone]. Phone number: .     Spoke to Akua.     5 days approved. Level of care: 201. Review on 8/8/2023. Authorization # [id number].

## 2023-08-04 NOTE — ED CARE HANDOFF
Emergency Department Sign Out Note        Sign out and transfer of care from [facility]. See Separate Emergency Department note. The patient, Cande Yepez, was evaluated by the previous provider for psychiatric encounter. Workup Completed:  Medically cleared for inpatient psych    ED Course / Workup Pending (followup):  Depression  Suicidal ideation with intent without a plan  Long standing depression; has never been treated                                   Procedures  MDM        Disposition  Final diagnoses:   Depression with suicidal ideation     Time reflects when diagnosis was documented in both MDM as applicable and the Disposition within this note     Time User Action Codes Description Comment    8/3/2023 10:52 PM Dorys Varghese Add [C69. A,  R45.851] Depression with suicidal ideation       ED Disposition     ED Disposition   Transfer to [address] [place] [address]   --    Date/Time   Thu Aug 3, 2023 10:52 PM    Comment   Viet Parsons should be transferred out to [facility] and has been medically cleared. Follow-up Information    None       Patient's Medications   Discharge Prescriptions    No medications on file     No discharge procedures on file.        ED Provider  Electronically Signed by     Daryn Baker DO  08/04/23 0127

## 2023-08-04 NOTE — ED NOTES
Patient is accepted at [facility]. Patient is accepted by Dr. Sobia Almanza NP HOSP DR. LOUISA SLADE per Kathryn/Intake. Transportation is arranged with pending. Transportation is scheduled for pending. Patient may go to the floor at anytime 1230 arrival.          Nurse report is to be called to [phone] prior to patient transfer.

## 2023-08-04 NOTE — ED NOTES
See previous [facility] Suicide Risk Assessment. Re-screening not required unless change in behavior or suicidal ideation. Behavioral Health Assessment deferred as patient is sleeping and would benefit from additional rest.  Vital signs deferred until patient awake, no signs or symptoms of respiratory distress at this time. Once patient is awake and able to participate, will complete assessments.        Gauri Zamna RN  08/04/23 3841
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
## 2023-08-04 NOTE — ED NOTES
Crisis spoke to the pt. Pt was informed about his 201 rights, 72 hours notice and inpatient psychiatric hospitalization process. Patient would like [facility] at this time.

## 2023-08-04 NOTE — ED NOTES
Attempted to call report to to [facility]. No answer will try again shortly.      Noam Villa RN  08/04/23 0490

## 2023-08-05 PROBLEM — F32.A DEPRESSION: Status: ACTIVE | Noted: 2023-08-05

## 2023-08-05 PROBLEM — F15.90 STIMULANT USE DISORDER: Status: ACTIVE | Noted: 2023-08-05

## 2023-08-05 PROBLEM — F15.90 STIMULANT USE DISORDER: Status: RESOLVED | Noted: 2023-08-05 | Resolved: 2023-08-05

## 2023-08-05 PROCEDURE — 99223 1ST HOSP IP/OBS HIGH 75: CPT | Performed by: PSYCHIATRY & NEUROLOGY

## 2023-08-05 NOTE — NURSING NOTE
Pt has irritable edge, low frustration tolerance, easily agitated. Focused on being DC'd. Denies SI/HI. Denies psychosis. Repeatedly demands "immediate DC. I am locked in here like it's a prison. It is a God given right for me to be able to go home!" Pt refuses vitals, refuses nicotine patch, refuses ECG. Pt is dismissive of staff in attempts to meet Pt needs. Pt is over heard banging walls and screaming in room after being informed by physician that he will not be DC'd today. Pt is reminded of unit protocols and expectations, and encouraged to take medications for anxiety/agitation. Pt states, "That's all you people try to do is push meds, meds, meds! I don't need that shit! I'd rather be homeless on the street like I was than in here. " Pt is encouraged to attempt to turn negatives into positives as a coping skill, and participate in [place] during length of admission. Pt not receptive and lacks insight. Will remain free from outburst and negative behaviors on the unit. Staff to maintain Q7's and offer support and redirection as needed.

## 2023-08-05 NOTE — NURSING NOTE
Patient is isolating to his room. Patient did come out for snack but then right back to his room. Patient states he is not SI but is depressed. Patient is cooperative on approach patient did make a few phone calls this shift but then was back in his room.

## 2023-08-05 NOTE — PROGRESS NOTES
Progress Note - Padmini Parsons 41 y.o. male MRN: [id number]    Unit/Bed#: [facility] 256-01 Encounter: [id number]        Subjective:   Patient seen and examined at bedside after reviewing the chart and discussing the case with the caring staff. Patient examined at bedside. Patient has no acute issues. Patient's labs including vitamin D and B12 levels are still pending. Physical Exam   Vitals: Blood pressure 132/94, pulse 84, temperature 98.3 °F (36.8 °C), temperature source Temporal, resp. rate 16, height 6' 1" (1.854 m), weight 77.8 kg (171 lb 9.6 oz), SpO2 99 %. ,Body mass index is 22.64 kg/m². Constitutional: He appears well-developed. HEENT: PERR, EOMI, MMM  Cardiovascular: Normal rate and regular rhythm. Pulmonary/Chest: Effort normal and breath sounds normal.   Abdomen: Soft, + BS, NT    Assessment/Plan:  Brody Forrester is a(n) 41 y.o. male with MDD.     1. Tobacco abuse. NRT. 2. Insomnia. Patient may take trazodone as needed. 3. GERD. Stable. Maalox as needed. 4. Arthralgias. Patient may take Tylenol as needed.

## 2023-08-05 NOTE — TREATMENT PLAN
TREATMENT PLAN REVIEW - [facility] A Best 41 y.o. 1985 male MRN: [id number]    [address] [address] Room / Bed: [facility] 373-97 Encounter: [id number]          Admit Date/Time:  8/4/2023  3:43 PM    Treatment Team: Attending Provider: Aram Perdomo MD; Consulting Physician: Poly Moreno MD; Patient Care Assistant: Te Perkins;  Patient Care Assistant: Cristina Ibarra; Patient Care Technician: Gretchen Gross; Patient Care Assistant: Jumana Ross; Registered Nurse: Tommy Victoria RN    Diagnosis: Principal Problem:    Depression  Active Problems:    Stimulant use disorder      Patient Strengths/Assets: patient is willing to work on problems    Patient Barriers/Limitations: homeless    Short Term Goals: decrease in depressive symptoms    Long Term Goals: acceptance of need for psychiatric follow up after discharge    Progress Towards Goals: starting psychiatric medications as prescribed    Recommended Treatment: medication management, patient medication education, group therapy, milieu therapy, continued Behavioral Health psychiatric evaluation/assessment process    Treatment Frequency: daily medication monitoring, group and milieu therapy daily, monitoring through interdisciplinary rounds, monitoring through weekly patient care conferences    Expected Discharge Date:  5 days    Discharge Plan: referrals as indicated    Treatment Plan Created/Updated By: Louie Cee MD

## 2023-08-05 NOTE — H&P
Psychiatric Evaluation - [address] A Best 41 y.o. male MRN: [id number]  Unit/Bed#: [facility] 256-01 Encounter: [id number]    Assessment/Plan   Principal Problem:    Depression    Plan:   1. Patient is declining medications  2. Collateral from family  3. Group and milieu therapy    Current Facility-Administered Medications   Medication Dose Route Frequency Provider Last Rate   • acetaminophen  650 mg Oral Q6H PRN Lorrie Manna Medei, CRNP     • acetaminophen  650 mg Oral Q4H PRN [place] Manna Medei, CRNP     • acetaminophen  975 mg Oral Q6H PRN [place] Manna Medei, CRNP     • aluminum-magnesium hydroxide-simethicone  30 mL Oral Q4H PRN Lorrie Manna Medei, CRNP     • benztropine  1 mg Oral Q6H PRN Lorrie Manna Medei, CRNP     • hydrOXYzine HCL  50 mg Oral Q6H PRN Max 4/day Lorrie Manna Medei, CRNP      Or   • diphenhydrAMINE  50 mg Intramuscular Q6H PRN Lorrie Manna Medei, CRNP     • hydrOXYzine HCL  100 mg Oral Q6H PRN Max 4/day [place] Manna Medei, CRNP      Or   • LORazepam  2 mg Intramuscular Q6H PRN [place] Manna Medei, CRNP     • hydrOXYzine HCL  25 mg Oral Q6H PRN Max 4/day Lorrie Manna [facility]ENTER[facility]     • nicotine  1 patch Transdermal Daily [place] Manna Medei, CRNP     • OLANZapine  5 mg Oral Q4H PRN Max 3/day [place] Manna Medei, CRNP      Or   • OLANZapine  2.5 mg Intramuscular Q4H PRN Max 3/day Lorrie Manna Medei, CRNP     • OLANZapine  5 mg Oral Q3H PRN Max 3/day [place] Manna Medei, CRNP      Or   • OLANZapine  5 mg Intramuscular Q3H PRN Max 3/day Lorrie Manna Medei, CRNP     • OLANZapine  2.5 mg Oral Q4H PRN Max 6/day Lorrie Manna Medei, CRNP     • polyethylene glycol  17 g Oral Daily PRN [place] Manna Medei, CRNP     • traZODone  100 mg Oral HS PRN Lorrie Manna Medei, CRNP       Risks, benefits and possible side effects of Medications:   Risks, benefits, and possible side effects of medications explained to patient and patient verbalizes understanding.      no medications at this time    Chief Complaint: "I don't need to be here"    History of Present Illness Patient is a 41 y.o. male presents with Signs of suicidal potential.  Patient was admitted to psychiatric unit on a voluntarily 201 commitment basis. Primary complaints include: depression worse. Onset of symptoms was gradual starting since March with gradually worsening course since that time. Psychosocial Stressors: family and financial.    Per cirsis evaluation on "Pt presented to the [facility] for a psychiatric evaluation. He endorses suicidal ideation with no specific plan. Pt states he has been feeling derpessed for several weeks. He lost his job 2 days ago. He has been sleep deprieved, loss of appetite. Pt states he has not showered in 2 weeks. He denies alcohol or substance use, denies homicidal ideations, auditory, visual halluciations or thougths to self harm. Pt denies past psychiatric history. Pt is oriented across all spheres, has a flat affect, speaks softly and has a depressed mood. This writer discussed treatment options with patient. Patient signed [id number] after rights and 72 hours were discussed and reviewed."  On interview he is tearful about missing his family and wants to leave to get an apartment and get his ID so he can see them. Does not seem to understand rules of 201 and 72 hour notice and threatens "to make things difficult" if not dc today. Tearful, frustrated with is situation with his ex and his children and said he could doing so much more for himself if he was outside. He denies any SI or HI currently and said he thought if he came his SO would bring his daughters by. Interview ended early when 67 hour notice offered. Spoke with patient again around 1500, he is requesting to leave this weekend, does not want medications. He discloses significant financial stressors including needing to pay child support that is coming due, needing to get a motel room. Did report he had suicidal thoughts, no plan "they come and go" when he sought help but is denying them now.  Offered PRN and he declined. Psychiatric Review Of Systems:  sleep: yes  appetite changes: no  weight changes: no  energy/anergy: no    Historical Information     Past Psychiatric History:   Inpatient Treatment: no  Outpatient Treatment: no  Past Suicide Attempts: no  Past Violent Behavior: no  Past Psychiatric Medication Trials: no    Substance Abuse History:  E-Cigarette/Vaping   • E-Cigarette Use Never User       E-Cigarette/Vaping Substances   • Nicotine Yes        Social History     Tobacco History     Smoking Status  Every Day Smoking Tobacco Type  Cigarettes    Smokeless Tobacco Use  Unknown          Alcohol History     Alcohol Use Status  Never          Drug Use     Drug Use Status  Yes Types  Marijuana          Sexual Activity     Sexually Active  Not Currently          Activities of Daily Living    Not Asked               Additional Substance Use Detail     Questions Responses    Problems Due to Past Use of Alcohol? No    Problems Due to Past Use of Substances? Yes    Cannabis method Smoke    Comment:  Smoke on 8/4/2023     Cocaine frequency Never used    Comment:  Never used on 8/4/2023     Narcotic Frequency Denies use in past 12 months    Benzodiazepine Frequency Denies use in past 12 months    Amphetamine frequency Denies use in past 12 months    Inhalant frequency Never used    Comment:  Never used on 8/4/2023     Hallucinogen frequency Never used    Comment:  Never used on 8/4/2023     Ecstasy frequency Never used    Comment:  Never used on 8/4/2023     Other drug frequency Never used    Comment:  Never used on 8/4/2023     Not reviewed. UDS + amphetamine but denies use  I am unable to assess the patient for substance use within the past 12 months as they are unable or unwilling to answer  Interview ended before assesing  Social History:  Has two daughters with their mother, currently unemployed with no job or housing  Traumatic History:   Abuse: hx abuse  No past medical history on file.     Medical Review Of Systems:  Pertinent items are noted in HPI.     Meds/Allergies   all current active meds have been reviewed and current meds:   Current Facility-Administered Medications   Medication Dose Route Frequency   • acetaminophen (TYLENOL) tablet 650 mg  650 mg Oral Q6H PRN   • acetaminophen (TYLENOL) tablet 650 mg  650 mg Oral Q4H PRN   • acetaminophen (TYLENOL) tablet 975 mg  975 mg Oral Q6H PRN   • aluminum-magnesium hydroxide-simethicone (MAALOX) oral suspension 30 mL  30 mL Oral Q4H PRN   • benztropine (COGENTIN) tablet 1 mg  1 mg Oral Q6H PRN   • hydrOXYzine HCL (ATARAX) tablet 50 mg  50 mg Oral Q6H PRN Max 4/day    Or   • diphenhydrAMINE (BENADRYL) injection 50 mg  50 mg Intramuscular Q6H PRN   • hydrOXYzine HCL (ATARAX) tablet 100 mg  100 mg Oral Q6H PRN Max 4/day    Or   • LORazepam (ATIVAN) injection 2 mg  2 mg Intramuscular Q6H PRN   • hydrOXYzine HCL (ATARAX) tablet 25 mg  25 mg Oral Q6H PRN Max 4/day   • nicotine (NICODERM CQ) 21 mg/24 hr TD 24 hr patch 1 patch  1 patch Transdermal Daily   • OLANZapine (ZyPREXA) tablet 5 mg  5 mg Oral Q4H PRN Max 3/day    Or   • OLANZapine (ZyPREXA) IM injection 2.5 mg  2.5 mg Intramuscular Q4H PRN Max 3/day   • OLANZapine (ZyPREXA) tablet 5 mg  5 mg Oral Q3H PRN Max 3/day    Or   • OLANZapine (ZyPREXA) IM injection 5 mg  5 mg Intramuscular Q3H PRN Max 3/day   • OLANZapine (ZyPREXA) tablet 2.5 mg  2.5 mg Oral Q4H PRN Max 6/day   • polyethylene glycol (MIRALAX) packet 17 g  17 g Oral Daily PRN   • traZODone (DESYREL) tablet 100 mg  100 mg Oral HS PRN     No Known Allergies    Objective   Vital signs in last 24 hours:  Temp:  [98.3 °F (36.8 °C)] 98.3 °F (36.8 °C)  HR:  [72-84] 84  Resp:  [16] 16  BP: (130-132)/(74-94) 132/94    No intake or output data in the 24 hours ending 08/05/23 9443    Mental Status Evaluation:  Appearance:  age appropriate and in scrubs   Behavior:  mostly cooperative, tearful   Speech:  normal pitch and normal volume   Mood:  dysthymic   Affect: mood-congruent   Language: naming objects   Thought Process:  normal   Associations: intact associations   Thought Content:  normal   Perceptual Disturbances: None   Risk Potential: Suicidal Ideations none  Homicidal Ideations none  Potential for Aggression No   Sensorium:  person, place and situation   Memory:  recent and remote memory grossly intact   Consciousness:  alert and awake    Attention: attention span appeared expected for age   Intellect: within normal limits   Fund of Knowledge: awareness of current events: intact   Insight:  fair   Judgment: limited   Muscle Strength:  Muscle Tone: In bed     Gait/Station: normal gait/station   Motor Activity: no abnormal movements     Lab Results: I have personally reviewed all pertinent laboratory/tests results. Most Recent Labs: No results found for: "WBC", "RBC", "HGB", "HCT", "PLT", "RDW", "NEUTROABS", "SODIUM", "K", "CL", "CO2", "BUN", "CREATININE", "GLUC", "GLUF", "CALCIUM", "AST", "ALT", "ALKPHOS", "TP", "ALB", "TBILI", "CHOLESTEROL", "HDL", "TRIG", "LDLCALC", "[facility]", "VALPROICTOT", "CARBAMAZEPIN", "LITHIUM", "AMMONIA", "[id number]", "[place]", "Charleen Dion", "PREGSERUM", "HCG", "HCGQUANT", "RPR", "HGBA1C", "EAG"     Code Status: Level 1 - Full Code  Advance Directive and Living Will:      Power of :    POLST:      Patient Strengths/Assets: patient is on a voluntary commitment    Patient Barriers/Limitations: homeless    Counseling / Coordination of Care  Total floor / unit time spent today 45 minutes. Greater than 50% of total time was spent with the patient and / or family counseling and / or coordination of care.  A description of the counseling / coordination of care: treatment plan

## 2023-08-05 NOTE — PLAN OF CARE

## 2023-08-06 VITALS
BODY MASS INDEX: 22.74 KG/M2 | DIASTOLIC BLOOD PRESSURE: 89 MMHG | WEIGHT: 171.6 LBS | SYSTOLIC BLOOD PRESSURE: 153 MMHG | HEART RATE: 67 BPM | OXYGEN SATURATION: 98 % | HEIGHT: 73 IN | RESPIRATION RATE: 16 BRPM | TEMPERATURE: 98.4 F

## 2023-08-06 PROCEDURE — 99239 HOSP IP/OBS DSCHRG MGMT >30: CPT | Performed by: PSYCHIATRY & NEUROLOGY

## 2023-08-06 RX ORDER — OLANZAPINE 5 MG/1
5 TABLET ORAL 2 TIMES DAILY PRN
Qty: 14 TABLET | Refills: 0 | Status: SHIPPED | OUTPATIENT
Start: 2023-08-06

## 2023-08-06 RX ORDER — HYDROXYZINE 50 MG/1
50 TABLET, FILM COATED ORAL EVERY 6 HOURS PRN
Qty: 20 TABLET | Refills: 0 | Status: SHIPPED | OUTPATIENT
Start: 2023-08-06

## 2023-08-06 RX ADMIN — HYDROXYZINE HYDROCHLORIDE 100 MG: 50 TABLET, FILM COATED ORAL at 09:27

## 2023-08-06 RX ADMIN — OLANZAPINE 5 MG: 5 TABLET, FILM COATED ORAL at 09:27

## 2023-08-06 NOTE — DISCHARGE SUMMARY
Discharge Summary - [address] [facility] [address] CASSIE Best 41 y.o. male MRN: [id number]  Unit/Bed#: [email or web address] 256-01 Encounter: [id number]     Admission Date: 8/4/2023         Discharge Date: 8/6/2023    Attending Psychiatrist: Severiano Dustman, MD    Reason for Admission/HPI: Major depressive disorder [F32.9]    Patient is a 41 y.o. male presented with signs of suicidal potential.    According to admission report from Dr. Carola Shearer    "Patient is a 41 y.o. male presents with Signs of suicidal potential.  Patient was admitted to psychiatric unit on a voluntarily 201 commitment basis.     Primary complaints include: depression worse. Onset of symptoms was gradual starting since March with gradually worsening course since that time. Psychosocial Stressors: family and financial.     Per cirsis evaluation on "Pt presented to the [address] [place] [address] for a psychiatric evaluation. Yue Dominguez endorses suicidal ideation with no specific plan. Pt states he has been feeling derpessed for several weeks. He lost his job 2 days ago. He has been sleep deprieved, loss of appetite.  Pt states he has not showered in 2 weeks. He denies alcohol or substance use, denies homicidal ideations, auditory, visual halluciations or thougths to self harm.  Pt denies past psychiatric history.  Pt is oriented across all spheres, has a flat affect, speaks softly and has a depressed mood. This writer discussed treatment options with patient. Patient signed 12 after rights and 72 hours were discussed and reviewed."  On interview he is tearful about missing his family and wants to leave to get an apartment and get his ID so he can see them. Does not seem to understand rules of 201 and 72 hour notice and threatens "to make things difficult" if not dc today. Tearful, frustrated with is situation with his ex and his children and said he could doing so much more for himself if he was outside.  He denies any SI or HI currently and said he thought if he came his SO would bring his daughters by. Interview ended early when 67 hour notice offered.     Spoke with patient again around 1500, he is requesting to leave this weekend, does not want medications. He discloses significant financial stressors including needing to pay child support that is coming due, needing to get a motel room. Did report he had suicidal thoughts, no plan "they come and go" when he sought help but is denying them now. Offered PRN and he declined. "    Hospital Course: The patient was admitted to the inpatient psychiatric unit and started on every 7 minutes precautions. During the hospitalization the patient was attending individual therapy, group therapy, milieu therapy and occupational therapy. Patient continued to refuse psychiatric medications stating he wished to try therapy first.  Continued to request discharge. Has been irritable and argumentative on unit. Loud and demanding discharge, banging fists- but later apologetic for behavior. He did agree to take as needed Zyprexa today for agitation and hydroxyzine for anxiety and states those medications were helpful. He is agreeable to these medications as needed upon discharge. He was calm and cooperative on interview today. He again stated that he did not realize he was signing himself on to a locked psychiatric unit. He is denying any current suicidal ideation. He states when seen in the crisis center he did admit to having vague suicidal thoughts. When I asked him questions about specific plan or intent, he states he never had either. He also states that hearing those questions made him realize that he would never do anything to hurt himself. He just had some fleeting passive death wish. We discussed options for him receiving outpatient therapy and he was very enthusiastic about our partial program stating "that is exactly what I was looking for ".   I did make ambulatory Referral to [facility].     Mood was stable at the time of discharge. The patient denied suicidal ideation, intent or plan at the time of discharge and denied homicidal ideation, intent or plan at the time of discharge. There was no overt psychosis at the time of discharge. Sleep and appetite were improved. The patient was tolerating medications and was not reporting any significant side effects at the time of discharge. Since the patient was doing well at the end of the hospitalization, treatment team felt that the patient could be safely discharged to outpatient care. The outpatient follow up with [facility] at [address] [place] [address] , referral was sent by myself upon discharge. UDS was positive for amphetamine and THC, but he denies abuse or dependence of either substance. Denies need for substance abuse follow up. Mental Status at time of Discharge:     Appearance:  disheveled   Behavior:  guarded   Speech:  normal pitch and normal volume   Mood:  anxious   Affect:  mood-congruent   Thought Process:  logical   Thought Content:  normal   Perceptual Disturbances: None   Risk Potential: Suicidal Ideations none, Homicidal Ideations none and Potential for Aggression No   Sensorium:  person, place, time/date and situation   Cognition:  recent and remote memory grossly intact   Consciousness:  alert and awake    Attention: attention span and concentration were age appropriate   Insight:  limited   Judgment: limited   Gait/Station: normal gait/station and normal balance   Motor Activity: no abnormal movements     Admission Diagnosis:Major depressive disorder [F32.9]    Discharge Diagnosis:   Principal Problem:    Depression  Resolved Problems:    Stimulant use disorder        Lab results:  No visits with results within 1 Day(s) from this visit.    Latest known visit with results is:   Admission on 08/03/2023, Discharged on 08/04/2023   Component Date Value   • Amph/Meth UR 08/04/2023 Positive (A)    • Barbiturate Ur 08/04/2023 Negative    • Benzodiazepine Urine 08/04/2023 Negative    • Cocaine Urine 08/04/2023 Negative    • Methadone Urine 08/04/2023 Negative    • Opiate Urine 08/04/2023 Negative    • PCP Ur 08/04/2023 Negative    • THC Urine 08/04/2023 Positive (A)    • Oxycodone Urine 08/04/2023 Negative    • EXTBreath Alcohol 08/04/2023 0.00        Discharge Medications:  Current Discharge Medication List      START taking these medications    Details   hydrOXYzine HCL (ATARAX) 50 mg tablet Take 1 tablet (50 mg total) by mouth every 6 (six) hours as needed for anxiety  Qty: 20 tablet, Refills: 0    Associated Diagnoses: Anxiety      OLANZapine (ZyPREXA) 5 mg tablet Take 1 tablet (5 mg total) by mouth 2 (two) times a day as needed (Moderate agitation)  Qty: 14 tablet, Refills: 0    Associated Diagnoses: Mood disorder ([address])            Current Discharge Medication List      STOP taking these medications       gabapentin (NEURONTIN) 300 mg capsule Comments:   Reason for Stopping:         methylPREDNISolone 4 MG tablet therapy pack Comments:   Reason for Stopping:              Current Discharge Medication List         Current Discharge Medication List           Discharge instructions/Information to patient and family:   See after visit summary for information provided to patient and family. Provisions for Follow-Up Care:  See after visit summary for information related to follow-up care and any pertinent home health orders. Discharge Statement     I spent 35 minutes discharging the patient. This time was spent on the day of discharge. I had direct contact with the patient on the day of discharge. Additional documentation is required if more than 30 minutes were spent on discharge:    I reviewed with Shantel Queen importance of compliance with medications and outpatient treatment after discharge. I discussed outpatient follow up with Shantel Queen. I reviewed with Shantel Queen crisis plan and safety plan upon discharge.   I discussed with Gail Olsen recommendation to follow up with outpatient drug and alcohol counseling and AA meetings. Gail Olsen agreed to abstain from drug and alcohol use after discharge. Gail Olsen signed 72 hour notice and requested discharge. At the time of the 72 hour notice expiration he had no criteria for involuntary commitment and denied any suicidal or homicidal ideation.   No records found for controlled prescriptions according to [address] [place] [address] Monitoring Program.

## 2023-08-06 NOTE — NURSING NOTE
Pt has been sleeping quietly in bed since group; has been calm and cooperative since being administered PRN medication and  informed of DC. Pt continues to express importance of DC in order to maintain financial stability, and repeats that he was misinformed about legalities of  acute inpatient [place]. Pt had the understanding that he could leave at any time because he was a voluntary admission. Pt describes plans to follow up w/ OP partial program. Staff educates on Crisis hotlines, and encourages Pt to utilize OP resources in order to prevent readmission. Pt is walked off unit by staff w/ belongings accounted/signed for and in hand.

## 2023-08-06 NOTE — PROGRESS NOTES
Progress Note - Manuel Parsons 41 y.o. male MRN: [id number]    Unit/Bed#: [facility] 256-01 Encounter: [id number]        Subjective:   Patient seen and examined at bedside after reviewing the chart and discussing the case with the caring staff. Patient examined at bedside. Patient has no acute issues. Patient is being discharged today. Patient is requesting all his prescriptions. I reviewed and reconciled patient's problem list and medications. Physical Exam   Vitals: Blood pressure 153/89, pulse 67, temperature 98.4 °F (36.9 °C), temperature source Temporal, resp. rate 16, height 6' 1" (1.854 m), weight 77.8 kg (171 lb 9.6 oz), SpO2 98 %. ,Body mass index is 22.64 kg/m². Constitutional: He appears well-developed. HEENT: PERR, EOMI, MMM  Cardiovascular: Normal rate and regular rhythm. Pulmonary/Chest: Effort normal and breath sounds normal.   Abdomen: Soft, + BS, NT    Assessment/Plan:  Doug Mercedes is a(n) 41 y.o. male with MDD.     Medical clearance. Patient is medically cleared for discharge. All scripts will be sent out for the patient. 1. Tobacco abuse. NRT. 2. Insomnia. Patient may take trazodone as needed. 3. GERD. Stable. Maalox as needed. 4. Arthralgias. Patient may take Tylenol as needed.

## 2023-08-06 NOTE — NURSING NOTE
Patient was visible on the unit. Attended snack. Retreated back to room immediately after snack. Denies any SI/HI,AV/H, anxiety or depression. Remained calm and no irritability during a check in. Stated it was of you to check on me. No HS medications. No PRN's. Will continue to monitor.

## 2023-08-06 NOTE — NURSING NOTE
PRN medications appear to be effective. Pt currently sitting quietly in group w/ peers. Will cont to monitor and provide support as needed.

## 2023-08-06 NOTE — NURSING NOTE
Patient is being discharged today with the following belongings:  x2 tshirts  x1 pair of socks  x1 pair of shoes  x1 jeans  x1 joggers  x1 red hoodie  x1 backpack  x1 protein bar  X [employer] debit card  x3 lighters  x1   x2 headphones  x1 charging block  x1 cellphone  x1 bag of cigarettes    Reviewed with patient and signed personal belongings checklist.

## 2023-08-06 NOTE — BH TRANSITION RECORD
Contact Information: If you have any questions, concerns, pended studies, tests and/or procedures, or emergencies regarding your inpatient behavioral health visit. Please contact [facility] behavioral health unit [phone] and ask to speak to a , nurse or physician. A contact is available 24 hours/ 7 days a week at this number. Summary of Procedures Performed During your Stay:  Below is a list of major procedures performed during your hospital stay and a summary of results:  - No major procedures performed. Pending Studies (From admission, onward)     Start     Ordered    08/05/23 0600  Comprehensive metabolic panel  Morning draw         08/04/23 1624    08/05/23 0600  CBC and differential  Morning draw         08/04/23 1624    08/05/23 0600  TSH, 3rd generation with Free T4 reflex  Morning draw         08/04/23 1624    08/05/23 0600  Lipid panel  Morning draw         08/04/23 1624    08/05/23 0600  Hemoglobin A1C  Morning draw         08/04/23 1624    08/05/23 0600  Folate  Morning draw         08/04/23 1640    08/05/23 0600  Vitamin B12  Morning draw         08/04/23 1640              Please follow up on the above pending studies with your PCP and/or referring provider.

## 2023-08-06 NOTE — NURSING NOTE
Pt has been irritable and easily agitated so far in shift. Denies SI/HI. Denies psychosis. Continues to focus on DC; immediately begins asking to speak to physician about being [address] when this writer begins shift at [address] Pt has been argumentative w/roomate after being redirected and reminded of unit expectations several times by staff. Pt encouraged to focus on self and own Senath. Pt then becomes severely agitated and explosive. banging fists on wall and table. Screaming and demanding to be DC'd. States, "It is my right to leave. This place is like a [facility]PC, and is making me worse!"  Pt minimally receptive to staff encouragement and redirection. Pt is willing to take PRN medication. 100mg Atarax given w/ Zyprexa 5mg for a riley of 26 and a broset of 3. Pt becomes tearful and regretful for behavior, stating, "I am not usually like this. I just really want to leave and get outpatient Senath. I am so worried about my bills and child support." Staff support provided, and encouragement to utilize coping skills. Will monitor for effectiveness of medications.

## 2023-08-06 NOTE — PLAN OF CARE
Problem: Depression  Goal: Verbalize thoughts and feelings  Description: Interventions:  - Assess and re-assess patient's level of risk   - Engage patient in 1:1 interactions, daily, for a minimum of 15 minutes   - Encourage patient to express feelings, fears, frustrations, hopes   Outcome: Progressing     Problem: Depression  Goal: Refrain from isolation  Description: Interventions:  - Develop a trusting relationship   - Encourage socialization   Outcome: Not Progressing

## 2023-08-07 ENCOUNTER — TELEPHONE (OUTPATIENT)
Dept: PSYCHOLOGY | Facility: CLINIC | Age: 38
End: 2023-08-07